# Patient Record
Sex: FEMALE | Race: WHITE | Employment: UNEMPLOYED | URBAN - NONMETROPOLITAN AREA
[De-identification: names, ages, dates, MRNs, and addresses within clinical notes are randomized per-mention and may not be internally consistent; named-entity substitution may affect disease eponyms.]

---

## 2020-08-26 ENCOUNTER — OFFICE VISIT (OUTPATIENT)
Dept: OBGYN | Age: 40
End: 2020-08-26
Payer: COMMERCIAL

## 2020-08-26 ENCOUNTER — HOSPITAL ENCOUNTER (OUTPATIENT)
Age: 40
Setting detail: SPECIMEN
Discharge: HOME | End: 2020-08-26
Payer: COMMERCIAL

## 2020-08-26 VITALS
HEIGHT: 66 IN | HEART RATE: 88 BPM | BODY MASS INDEX: 33.27 KG/M2 | SYSTOLIC BLOOD PRESSURE: 118 MMHG | DIASTOLIC BLOOD PRESSURE: 74 MMHG | TEMPERATURE: 96 F | WEIGHT: 207 LBS

## 2020-08-26 PROCEDURE — 99385 PREV VISIT NEW AGE 18-39: CPT | Performed by: NURSE PRACTITIONER

## 2020-08-26 PROCEDURE — G0145 SCR C/V CYTO,THINLAYER,RESCR: HCPCS

## 2020-08-26 RX ORDER — NORETHINDRONE ACETATE AND ETHINYL ESTRADIOL AND FERROUS FUMARATE 1MG-20(24)
KIT ORAL
COMMUNITY
Start: 2020-08-20

## 2020-08-26 SDOH — HEALTH STABILITY: MENTAL HEALTH: HOW OFTEN DO YOU HAVE A DRINK CONTAINING ALCOHOL?: NEVER

## 2020-08-26 ASSESSMENT — PATIENT HEALTH QUESTIONNAIRE - PHQ9
SUM OF ALL RESPONSES TO PHQ9 QUESTIONS 1 & 2: 0
1. LITTLE INTEREST OR PLEASURE IN DOING THINGS: 0
SUM OF ALL RESPONSES TO PHQ QUESTIONS 1-9: 0
SUM OF ALL RESPONSES TO PHQ QUESTIONS 1-9: 0
2. FEELING DOWN, DEPRESSED OR HOPELESS: 0

## 2020-08-26 NOTE — PROGRESS NOTES
Cyndee Ivan  2020              46 y.o. Chief Complaint   Patient presents with    Gynecologic Exam         Patient's last menstrual period was 2020. No referring provider defined for this encounter. HPI :Annual Exam  Patient presents for annual exam.  Counseling on healthy lifestyle reviewed, as well as the need for self breast exam. We discussed and reviewed the need for routine screenings and immunization updates when appropriate. Performs monthly self breast exams. On OCP and getting refills through and online pharmacy. Has a 1 year prescription. Menses have now stopped after taking 3 months of the pill. Using continuous regimen. Is not currently planning future pregnancies. Discussed vitamin D deficiency. Grew up in [place] and had a lot of sun exposure. Has numerous moles and skin lesions. Has not had skin assessment with dermatology, but would like one. The patient is sexually active. last pap: was normal  The patient has regular exercise: yes . We did review the need for and frequency of both weight bearing and strengthening as tolerated by the patient. 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 Lift on left and implant only on right. Menopause at n/a      Patient's last menstrual period was 07/24/2020. Hormone Exposure: Yes     Family History of Breast, Ovarian , Colon or Uterine Cancer: No     Preventative Health Testing:  Date of Last Pap Smear: 2016-done in [place]    ________________________________________________________________________      Review Of Systems:  General ROS:  negative  Hematological and Lymphatic ROS:negative   Breast ROS: negative  Cardiovascular ROS: negative  Respiratory ROS: negative   Gastrointestinal ROS: negative  Genito-Urinary ROS: negative  Psychological ROS: negative  Neurological ROS: negative  Musculoskeletal ROS: negative  Dermatological ROS: positive for skin lesions                                                                                                                                                                                   PHYSICAL Exam:     Constitutional:  Blood pressure 118/74, pulse 88, temperature 96 °F (35.6 °C), temperature source Temporal, height 5' 6\" (1.676 m), weight 207 lb (93.9 kg), last menstrual period 07/24/2020. General Appearance: This  is a well Developed, well Nourished, well groomed female. Her BMI was reviewed. Skin:  Numerous moles and other skin lesions. One dark mole left breast and other fleshy raised lesions on chest and back    Lymphatic:  No Lymph Nodes were Palpable in the neck , axilla or groin. Neck and EENT:  The neck was supple. There were no masses   The thyroid was not enlarged and had no masses. PERRLA, Nares Patent No Masses    Respiratory: The lungs were auscultated and found to be clear. There were no rales, rhonchi or wheezes. There was a good respiratory effort. Cardiovascular: The heart was in a regular rate and rhythm. . No S3 or S4. There was no murmur appreciated. Extremities: The patients extremities were without calf tenderness, edema, or varicosities. There was full range of motion in all four extremities. Pulses in all four extremities    Abdomen: The abdomen was soft and non-tender. There were good bowel sounds in all quadrants and there was no guarding, rebound or rigidity. On evaluation there was no evidence of hepatosplenomegaly and there was no costal vertebral noah tenderness bilaterally. No hernias were appreciated. Psych: The patient had a normal Orientation to: Time, Place, Person, and Situation  Mood and affect appropriate    Breast:  (Chest)  normal appearance, no masses or tenderness, Inspection negative, No nipple retraction or dimpling, No nipple discharge or bleeding, No axillary or supraclavicular adenopathy, Normal to palpation without dominant masses, positive findings: findings consistent with right breast lift, bilateral implants      Pelvic Exam:  External genitalia: normal general appearance  Urinary system: urethral meatus normal  Vaginal: normal without tenderness, induration or masses, normal rugae and cystocele present, (mild)  Cervix: normal appearance and thin prep PAP obtained  Adnexa: normal bimanual exam and non palpable  Uterus: normal single, nontender    Musculosk:  Normal Gait and station was noted. Digits were evaluated without abnormal findings. Range of motion, stability and strength were evaluated and found to be appropriate for the patients age. ASSESSMENT:      46 y.o. Annual   Diagnosis Orders   1. Well female exam with routine gynecological exam  CBC Auto Differential    Urinalysis with Microscopic    Comprehensive Metabolic Panel   2. Screening for cervical cancer  PAP SMEAR   3. Screening for lipoid disorders  Lipid Panel   4. Screening for thyroid disorder  TSH without Reflex    T4, Free   5. Encounter for vitamin deficiency screening  Vitamin D 25 Hydroxy   6. Skin lesions  [facility]   7.  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 Hereditary Breast, Ovarian, Colon and Uterine Cancer screening Done. Tobacco & Secondary smoke risks reviewed; instructed on cessation and avoidance    PLAN:  Return for Dermatology consult and pap in 1 year. Return for annual exams  Mammograms every 1 year. If 38 yo and last mammogram was negative. Routine health maintenance per patients PCP. Orders Placed This Encounter   Procedures    PAP SMEAR     Standing Status:   Future     Standing Expiration Date:   10/26/2020     Order Specific Question:   Collection Type     Answer: Thin Prep     Order Specific Question:   Prior Abnormal Pap Test     Answer:   No     Order Specific Question:   Screening or Diagnostic     Answer:   Screening     Order Specific Question:   HPV Requested? Answer:   Yes - If Abnormal Reflex HPV     Order Specific Question:   High Risk Patient     Answer:   N/A    CBC Auto Differential     Standing Status:   Future     Standing Expiration Date:   12/26/2020    Urinalysis with Microscopic     Standing Status:   Future     Standing Expiration Date:   12/26/2020     Order Specific Question:   SPECIFY(EX-CATH,MIDSTREAM,CYSTO,ETC)?      Answer:   midstream    Comprehensive Metabolic Panel     Standing Status:   Future     Standing Expiration Date:   12/26/2020    Lipid Panel     Standing Status:   Future     Standing Expiration Date:   12/26/2020     Order Specific Question:   Is Patient Fasting?/# of Hours     Answer:   yes    TSH without Reflex     Standing Status:   Future     Standing Expiration Date:   12/26/2020    T4, Free     Standing Status:   Future     Standing Expiration Date:   12/26/2020    Vitamin D 25 Hydroxy     Standing Status:   Future     Standing Expiration Date:   11/26/2020   [facility]     Referral Priority:   Routine     Referral Type:   Eval and Treat     Referral

## 2020-08-26 NOTE — PATIENT INSTRUCTIONS
Patient Education        Kegel Exercises: Care Instructions  Your Care Instructions     Kegel exercises strengthen muscles around the bladder. These muscles control the flow of urine. Kegel exercises are sometime called \"pelvic floor\" exercises. They can help prevent urine leakage and keep the pelvic organs in place. A woman who just had a baby might want to try Kegel exercises. They can strengthen pelvic muscles that have been weakened by pregnancy and childbirth. A man or woman may use Kegel exercises to treat urine leakage. You do Kegel exercises by tightening the muscles you use when you urinate. You will likely need to do these exercises for several weeks to get better. Follow-up care is a key part of your treatment and safety. Be sure to make and go to all appointments, and call your doctor if you are having problems. It's also a good idea to know your test results and keep a list of the medicines you take. How can you care for yourself at home? · Do Kegel exercises. ? Find the muscles you need to strengthen. To do this, tighten the muscles that stop your urine while you are going to the bathroom. These are the same muscles you squeeze during Kegel exercises. ? Squeeze the muscles as hard as you can. Your belly and thighs should not move. ? Hold the squeeze for 3 seconds. Then relax for 3 seconds. ? Start with 3 seconds, and then add 1 second each week until you are able to squeeze for 10 seconds. ? Repeat the exercise 10 to 15 times for each session. Do three or more sessions each day. · You can check to see if you are using the right muscles. Place a finger in your vagina and squeeze around it. You are doing them right when you feel pressure around your finger. Your doctor may also suggest that you put special weights in your vagina while you do the exercises. · Do not smoke. It can irritate the bladder. If you need help quitting, talk to your doctor about stop-smoking programs and medicines. These can increase your chances of quitting for good. Where can you learn more? Go to [email or web address] and sign in to your [employer] account. Enter T870 in the [employer] box to learn more about \"Kegel Exercises: Care Instructions. \"     If you do not have an account, please click on the \"Sign Up Now\" link. Current as of: August 22, 2019               Content Version: 12.5  © 1074-2031 Healthwise, Incorporated. Care instructions adapted under license by [facility] ([facility]). If you have questions about a medical condition or this instruction, always ask your healthcare professional. [address] any warranty or liability for your use of this information.

## 2020-08-28 ENCOUNTER — HOSPITAL ENCOUNTER (OUTPATIENT)
Dept: LAB | Age: 40
Discharge: HOME | End: 2020-08-28
Payer: COMMERCIAL

## 2020-08-28 LAB
-: ABNORMAL
ABSOLUTE EOS #: 0.1 K/UL (ref 0–0.44)
ABSOLUTE IMMATURE GRANULOCYTE: <0.03 K/UL (ref 0–0.3)
ABSOLUTE LYMPH #: 2.23 K/UL (ref 1.1–3.7)
ABSOLUTE MONO #: 0.25 K/UL (ref 0.1–1.2)
ALBUMIN SERPL-MCNC: 4.2 G/DL (ref 3.5–5.2)
ALBUMIN/GLOBULIN RATIO: 1.4 (ref 1–2.5)
ALP BLD-CCNC: 41 U/L (ref 35–104)
ALT SERPL-CCNC: 7 U/L (ref 5–33)
AMORPHOUS: ABNORMAL
ANION GAP SERPL CALCULATED.3IONS-SCNC: 9 MMOL/L (ref 9–17)
AST SERPL-CCNC: 12 U/L
BACTERIA: ABNORMAL
BASOPHILS # BLD: 1 % (ref 0–2)
BASOPHILS ABSOLUTE: 0.07 K/UL (ref 0–0.2)
BILIRUB SERPL-MCNC: 0.31 MG/DL (ref 0.3–1.2)
BILIRUBIN URINE: NEGATIVE
BUN BLDV-MCNC: 10 MG/DL (ref 6–20)
BUN/CREAT BLD: 13 (ref 9–20)
CALCIUM SERPL-MCNC: 9.2 MG/DL (ref 8.6–10.4)
CASTS UA: ABNORMAL /LPF (ref 0–2)
CHLORIDE BLD-SCNC: 107 MMOL/L (ref 98–107)
CHOLESTEROL/HDL RATIO: 4.3
CHOLESTEROL: 204 MG/DL
CO2: 24 MMOL/L (ref 20–31)
COLOR: ABNORMAL
COMMENT UA: ABNORMAL
CREAT SERPL-MCNC: 0.75 MG/DL (ref 0.5–0.9)
CRYSTALS, UA: ABNORMAL /HPF
DIFFERENTIAL TYPE: NORMAL
EOSINOPHILS RELATIVE PERCENT: 2 % (ref 1–4)
EPITHELIAL CELLS UA: ABNORMAL /HPF (ref 0–5)
GFR AFRICAN AMERICAN: >60 ML/MIN
GFR NON-AFRICAN AMERICAN: >60 ML/MIN
GFR SERPL CREATININE-BSD FRML MDRD: ABNORMAL ML/MIN/{1.73_M2}
GFR SERPL CREATININE-BSD FRML MDRD: ABNORMAL ML/MIN/{1.73_M2}
GLUCOSE BLD-MCNC: 106 MG/DL (ref 70–99)
GLUCOSE URINE: NEGATIVE
HCT VFR BLD CALC: 42.5 % (ref 36.3–47.1)
HDLC SERPL-MCNC: 48 MG/DL
HEMOGLOBIN: 13.8 G/DL (ref 11.9–15.1)
IMMATURE GRANULOCYTES: 0 %
KETONES, URINE: NEGATIVE
LDL CHOLESTEROL: 136 MG/DL (ref 0–130)
LEUKOCYTE ESTERASE, URINE: ABNORMAL
LYMPHOCYTES # BLD: 39 % (ref 24–43)
MCH RBC QN AUTO: 30.1 PG (ref 25.2–33.5)
MCHC RBC AUTO-ENTMCNC: 32.5 G/DL (ref 25.2–33.5)
MCV RBC AUTO: 92.6 FL (ref 82.6–102.9)
MONOCYTES # BLD: 4 % (ref 3–12)
MUCUS: ABNORMAL
NITRITE, URINE: NEGATIVE
NRBC AUTOMATED: 0 PER 100 WBC
OTHER OBSERVATIONS UA: ABNORMAL
PDW BLD-RTO: 12.4 % (ref 11.8–14.4)
PH UA: 6.5 (ref 5–6)
PLATELET # BLD: 278 K/UL (ref 138–453)
PLATELET ESTIMATE: NORMAL
PMV BLD AUTO: 9.7 FL (ref 8.1–13.5)
POTASSIUM SERPL-SCNC: 4.1 MMOL/L (ref 3.7–5.3)
PROTEIN UA: NEGATIVE
RBC # BLD: 4.59 M/UL (ref 3.95–5.11)
RBC # BLD: NORMAL 10*6/UL
RBC UA: ABNORMAL /HPF (ref 0–4)
RENAL EPITHELIAL, UA: ABNORMAL /HPF
SEG NEUTROPHILS: 54 % (ref 36–65)
SEGMENTED NEUTROPHILS ABSOLUTE COUNT: 3.13 K/UL (ref 1.5–8.1)
SODIUM BLD-SCNC: 140 MMOL/L (ref 135–144)
SPECIFIC GRAVITY UA: 1 (ref 1.01–1.02)
THYROXINE, FREE: 1.49 NG/DL (ref 0.93–1.7)
TOTAL PROTEIN: 7.1 G/DL (ref 6.4–8.3)
TRICHOMONAS: ABNORMAL
TRIGL SERPL-MCNC: 100 MG/DL
TSH SERPL DL<=0.05 MIU/L-ACNC: 0.2 MIU/L (ref 0.3–5)
TURBIDITY: ABNORMAL
URINE HGB: ABNORMAL
UROBILINOGEN, URINE: NORMAL
VITAMIN D 25-HYDROXY: 37 NG/ML (ref 30–100)
VLDLC SERPL CALC-MCNC: ABNORMAL MG/DL (ref 1–30)
WBC # BLD: 5.8 K/UL (ref 3.5–11.3)
WBC # BLD: NORMAL 10*3/UL
WBC UA: ABNORMAL /HPF (ref 0–4)
YEAST: ABNORMAL

## 2020-08-28 PROCEDURE — 85025 COMPLETE CBC W/AUTO DIFF WBC: CPT

## 2020-08-28 PROCEDURE — 84439 ASSAY OF FREE THYROXINE: CPT

## 2020-08-28 PROCEDURE — 80061 LIPID PANEL: CPT

## 2020-08-28 PROCEDURE — 36415 COLL VENOUS BLD VENIPUNCTURE: CPT

## 2020-08-28 PROCEDURE — 81001 URINALYSIS AUTO W/SCOPE: CPT

## 2020-08-28 PROCEDURE — 84443 ASSAY THYROID STIM HORMONE: CPT

## 2020-08-28 PROCEDURE — 82306 VITAMIN D 25 HYDROXY: CPT

## 2020-08-28 PROCEDURE — 80053 COMPREHEN METABOLIC PANEL: CPT

## 2020-09-01 LAB — CYTOLOGY REPORT: NORMAL

## 2020-10-19 ENCOUNTER — NURSE ONLY (OUTPATIENT)
Dept: PEDIATRICS | Age: 40
End: 2020-10-19
Payer: COMMERCIAL

## 2020-10-19 PROCEDURE — 90471 IMMUNIZATION ADMIN: CPT | Performed by: FAMILY MEDICINE

## 2020-10-19 PROCEDURE — 90686 IIV4 VACC NO PRSV 0.5 ML IM: CPT | Performed by: FAMILY MEDICINE

## 2021-03-22 ENCOUNTER — HOSPITAL ENCOUNTER (OUTPATIENT)
Age: 41
Setting detail: SPECIMEN
Discharge: HOME | End: 2021-03-22
Payer: COMMERCIAL

## 2021-03-22 ENCOUNTER — OFFICE VISIT (OUTPATIENT)
Dept: DERMATOLOGY | Age: 41
End: 2021-03-22
Payer: COMMERCIAL

## 2021-03-22 VITALS
BODY MASS INDEX: 35.32 KG/M2 | WEIGHT: 219.8 LBS | OXYGEN SATURATION: 99 % | DIASTOLIC BLOOD PRESSURE: 80 MMHG | HEIGHT: 66 IN | SYSTOLIC BLOOD PRESSURE: 130 MMHG | HEART RATE: 98 BPM

## 2021-03-22 DIAGNOSIS — L57.8 ACTINIC SKIN DAMAGE: ICD-10-CM

## 2021-03-22 DIAGNOSIS — D22.9 MULTIPLE NEVI: ICD-10-CM

## 2021-03-22 DIAGNOSIS — D48.5 NEOPLASM OF UNCERTAIN BEHAVIOR OF SKIN: Primary | ICD-10-CM

## 2021-03-22 PROCEDURE — 99203 OFFICE O/P NEW LOW 30 MIN: CPT | Performed by: DERMATOLOGY

## 2021-03-22 RX ORDER — OMEGA-3S/DHA/EPA/FISH OIL/D3 300MG-1000
400 CAPSULE ORAL DAILY
COMMUNITY

## 2021-03-22 NOTE — PATIENT INSTRUCTIONS

## 2021-03-24 LAB — DERMATOLOGY PATHOLOGY REPORT: NORMAL

## 2021-03-25 ENCOUNTER — TELEPHONE (OUTPATIENT)
Dept: DERMATOLOGY | Age: 41
End: 2021-03-25

## 2021-03-26 NOTE — TELEPHONE ENCOUNTER
No answer, will try back this afternoon
Please inform patient that the nevus on her breast was moderately atypical. To be on the safe side, we should re-excise with a small margin of normal skin. This will leave a small linear scar. This will need to be done in [place]. Please schedule 1 one hour and inform of post-procedural limitations. The one on the back was a mildly dysplastic nevus, nothing further needs to be done.
Pt advised.     Future Appointments   Date Time Provider Deena Staufferi   2021 10:00 AM Alfredo Jose MD [facility]   2021  9:00 AM PALMER Knapp - DIANE  [facility]   3/28/2022  1:15 PM [facility]
none

## 2021-04-08 ENCOUNTER — IMMUNIZATION (OUTPATIENT)
Dept: LAB | Age: 41
End: 2021-04-08
Payer: COMMERCIAL

## 2021-04-08 PROCEDURE — 0011A COVID-19, MODERNA VACCINE 100MCG/0.5ML DOSE: CPT | Performed by: INTERNAL MEDICINE

## 2021-04-08 PROCEDURE — 91301 COVID-19, MODERNA VACCINE 100MCG/0.5ML DOSE: CPT | Performed by: INTERNAL MEDICINE

## 2021-04-27 ENCOUNTER — PROCEDURE VISIT (OUTPATIENT)
Dept: DERMATOLOGY | Age: 41
End: 2021-04-27
Payer: COMMERCIAL

## 2021-04-27 VITALS
SYSTOLIC BLOOD PRESSURE: 136 MMHG | DIASTOLIC BLOOD PRESSURE: 84 MMHG | TEMPERATURE: 98 F | WEIGHT: 219 LBS | BODY MASS INDEX: 35.2 KG/M2 | HEIGHT: 66 IN | HEART RATE: 85 BPM | OXYGEN SATURATION: 97 %

## 2021-04-27 DIAGNOSIS — D22.9 ATYPICAL NEVUS: Primary | ICD-10-CM

## 2021-04-27 PROCEDURE — 11401 EXC TR-EXT B9+MARG 0.6-1 CM: CPT | Performed by: DERMATOLOGY

## 2021-04-27 PROCEDURE — 12032 INTMD RPR S/A/T/EXT 2.6-7.5: CPT | Performed by: DERMATOLOGY

## 2021-04-27 RX ORDER — LIDOCAINE HYDROCHLORIDE AND EPINEPHRINE 10; 10 MG/ML; UG/ML
10 INJECTION, SOLUTION INFILTRATION; PERINEURAL ONCE
Status: COMPLETED | OUTPATIENT
Start: 2021-04-27 | End: 2021-04-28

## 2021-04-27 NOTE — PATIENT INSTRUCTIONS
Post-operative Instructions for Excision Surgery    IF YOU HAVE SUTURES ON TOP OF THE SKIN  1. Wash hands with antibacterial soap before beginning wound care. Remove the pressure bandage the 24-48 hours after surgery. Apply Polysporin or Vaseline ointment over the counter with a Q-tip and/or gauze; cover with a clean band-aid 2-3 times daily UNTIL SUTURES OR STAPLES ARE REMOVED. The surgical area can get wet, unless otherwise directed. We recommend that you avoid direct water pressure on the surgical site during the healing process. Important: Keep the area moist with the ointment and covered at all times. Keeping the surgical site moist and covered will help to prevent scab formation. 2.  Wipe area with hydrogen peroxide on a Q-tip twice a day before applying ointment. IF YOU DO NOT HAVE SUTURES ON TOP OF THE SKIN    1. Leave bandage in place for 24-48 hours then remove bandage. 2. Leave Steri-strips in place and slowly trim back as the peel off  3. Gently wash area with soap and water do not scrub              ALL SURGERIES    1. For 2 days following surgery: No smoking, no ingestion of alcohol, no aspirin (unless prescribed as part of a cardiac or stroke regimen). Take only Tylenol (acetaminophen) or NSAIDS (Advil, Motrin, Aleve, Ibuprofen) for discomfort. Any blood-thinner or anticoagulation medication you regularly take should be continued at your prescribing doctors discretion. If you are on blood thinners I recommend buying over the counter wound seal powder in case of bleeding. If you have any bleeding please apply pressure for 15 minutes to the site and if still bleeding apply wound seal powder and call the office. 2. You may shower after removing the bandage applied the day of surgery (leave bandage in place for 24-48 hrs) - do not scrub the surgical site - gentle soap and water is ok  3.  For facial and scalp surgery: Do not bend over for 2 days and use 2 pillows to sleep for 2 nights. 4. Please note: Swelling and/or bruising may be visible below the surgical site due to gravitys pull. This may be more evident after facial procedures. Some very mild drainage onto the band-aid may be a normal part of wound healing a few days after surgery. 5. Do not do any heavy lifting or workout exercises UNTIL SUTURES OR STAPLES ARE REMOVED. No sit-ups, jogging, running, free-weight lifting, swimming, racquet ball, tennis, aerobics, golf, bowling, or speed-walking. You may walk at a normal pace. You may resume these activities after your suture/staple removal appointment. 6.  If the procedure is in an area where you shave, do not shave within 2 inches of the surgical site. 7. YUKO/Compression stocking instructions: If a YUKO/Compression stocking is applied, leave it in place until the morning. Following the dressing change, reapply the stocking and leave it on during the day. Remove the stocking at bedtime. Continue to wear the stocking until the sutures are removed. 8. During your surgery, your surgeon may have used an underlying layer of sutures, which are normally absorbed by the body. In some instances, the suture material may not be absorbed and you may need to come back for a follow-up visit. If you notice an acne-like bump or pimple forming along or in the suture line, as late as 2-8 weeks after your surgery, it may be the absorbable suture not being absorbed, and we would like you to call the office and make an appointment. An additional result of the procedure may be a slight raising or puckering at the edges of the surgical area due to overzealous healing at the surgery site. This may occur several weeks after the sutures have been removed. If you notice this, please call our office and make an appointment. 9. 2 weeks after excision (after sutures removed if external sutures) can use silicone gel or silicone tape to reduce scar appearance.  For silicone gel massage into scar twice daily for up to 12 weeks.  For silicone tape apply to scar and leave in place until it falls off then apply a new piece (silicone tape is recommended to be used for excision sites with substantial tension or movement (chest, shoulders, lower legs) use for 12 weeks    Additional comments or prescriptions:_____________________________________________________  Suture removal appointment Date & Time__________________________________________________  For emergencies, please call the office

## 2021-04-27 NOTE — PROGRESS NOTES
Dermatology Surgery Pre-Visit Checklist    (Please complete with  Y (yes) / N (no) or explain)    Pathologic Diagnosis: Dysplastic Nevus    Photo available of surgery site in media: yes     Competent to give informed consent?  Yes    If not, who is authorized to do so: no    Need for help for wound care: no   If so, who will do so: na    Are you diagnosed:   Diabetes: no   If yes, last A1C: na       HIV: no       Hepatitis: no       Current smoker: n  If yes, how much: na    So you have any of the following: Pacemaker: no       Defibrillator: no       Artificial Heart valve: no                Artificial Joints: no       Other Implantable Device: no       Organ Transplant: no       Other: no    Are you on blood thinners such as: Asprin: no       Warfarin/Coumadin: no       Other: no    Any allergies to the following:  Lidocaine: no       Iodine: no       Adhesive: no       Other: no

## 2021-04-28 RX ADMIN — LIDOCAINE HYDROCHLORIDE AND EPINEPHRINE 10 ML: 10; 10 INJECTION, SOLUTION INFILTRATION; PERINEURAL at 07:46

## 2021-04-28 NOTE — PROGRESS NOTES
Dermatology Procedure Note   [facility]YSIC[facility]ANS  [facility]  [address]  [facility] [address]  Dept: [phone]  Dept Fax: [phone]      Procedure Date: 4/27/2021  Procedure Time: 8:57 PM    Procedure Practitioner: Bo Wolf MD    Procedure: Excision    Pre-Procedure Diagnosis: Atypical Nevus    Post-Procedure Diagnosis: Same as Pre-Procedure Diagnosis    Informed Consent: The procedure and its risks were explained including but not limited to pain, bleeding, infection, permanent scar, permanent pigment alteration and recurrence. Consent to proceed with the procedure was obtained from the patient or the parent by the practitioner    Time Out:  A time out was conducted immediately before starting the procedure that confirmed a final verification of the correct patient, correct procedure, and correct site. Procedure Details:  Excision and layered closure: The 7 mm lesion on the left breast was cleaned with chlorhexidine and anesthetized with 1% lidocaine with epinephrine. The lesion was then \"excised in an elliptical fashion down to subcutaneous fat with a 1 mm margin for a total excised diameter of 9 mm. Hemostasis was then achieved with electrocautery. Due to tension on the defect standard undermining was performed to allow for closure. The subcutaneous tissues were then brought together with 4-0 Vicryl. The epidermis was approximated with 4-0 Monocryl subcuticular sutures, dermabond and steristrips. Final layered closure was 26 mm. Vaseline and a bandage were applied.     Procedure Performed By: Bo Wolf MD    Estimated Blood Loss: Minimal    Pathologic Specimen: H&E    Procedure Tolerance: Good    Complication(s): None    Electronically signed by Bo Wolf MD on 4/27/21 at 8:57 PM EDT

## 2021-05-06 ENCOUNTER — IMMUNIZATION (OUTPATIENT)
Dept: LAB | Age: 41
End: 2021-05-06
Payer: COMMERCIAL

## 2021-05-06 PROCEDURE — 91301 COVID-19, MODERNA VACCINE 100MCG/0.5ML DOSE: CPT | Performed by: INTERNAL MEDICINE

## 2021-05-06 PROCEDURE — 0012A COVID-19, MODERNA VACCINE 100MCG/0.5ML DOSE: CPT | Performed by: INTERNAL MEDICINE

## 2021-05-07 DIAGNOSIS — D22.9 ATYPICAL NEVUS: ICD-10-CM

## 2021-11-24 ENCOUNTER — NURSE ONLY (OUTPATIENT)
Dept: PEDIATRICS | Age: 41
End: 2021-11-24

## 2021-11-24 DIAGNOSIS — Z23 NEED FOR INFLUENZA VACCINATION: Primary | ICD-10-CM

## 2022-03-28 ENCOUNTER — OFFICE VISIT (OUTPATIENT)
Dept: DERMATOLOGY | Age: 42
End: 2022-03-28
Payer: COMMERCIAL

## 2022-03-28 VITALS
SYSTOLIC BLOOD PRESSURE: 124 MMHG | HEART RATE: 109 BPM | BODY MASS INDEX: 32.95 KG/M2 | OXYGEN SATURATION: 99 % | WEIGHT: 205 LBS | HEIGHT: 66 IN | DIASTOLIC BLOOD PRESSURE: 70 MMHG

## 2022-03-28 DIAGNOSIS — L91.0 KELOID: ICD-10-CM

## 2022-03-28 DIAGNOSIS — D22.9 MULTIPLE NEVI: Primary | ICD-10-CM

## 2022-03-28 PROCEDURE — 99212 OFFICE O/P EST SF 10 MIN: CPT | Performed by: DERMATOLOGY

## 2022-03-28 PROCEDURE — 99213 OFFICE O/P EST LOW 20 MIN: CPT | Performed by: DERMATOLOGY

## 2022-03-28 NOTE — PATIENT INSTRUCTIONS
- remove make up before next appointment  - continue using silicone gel sheets    Sun Protection     There are two types of sun rays that are harmful to the skin. UVA rays cause skin aging and skin cancer, such as melanoma. UVB rays cause sunburns, cataracts, and also contribute to skin cancer. The American-Academy of Dermatology recommends that children and adults wear a broad spectrum, waterproof sunscreen with a Sun Protection Factor (SPF) of 30 or higher. It is important to check the ingredient label to be sure the sunscreen will protect the skin from both UVA and UVB sunrays. Your sunscreen should contain at least one of the following ingredients: titanium dioxide, zinc oxide, or avobenzone. Sunscreen will not be effective unless it is applied to all exposed skin. Sunscreens work best if they are applied 30 minutes before sun exposure. They should be reapplied every 2 hours and after any water exposure. Sunscreen is not perfect. It is important to use other methods to protect the skin from sun exposure also. Wear hats, sunglasses and other sun protective clothing when outdoors. Stay in the shade during the peak hours of sun exposure between 10 AM and 4 PM.    Moles    Moles, or nevi, are very common. Moles are areas of the skin where there are more cells called melanocytes. Melanocytes are the cells in the body that produce pigment, or color. Moles can be many colors including skin-tone, pink, tan, brown, and very dark brown to black. Moles can be raised or flat. Moles can have hair. Moles can grow on any skin surface, including the scalp, hands and feet. When someone is born with a mole, or develops one in the first months of life, the mole is called a congenital, or birthmark mole. About 1 in 100 people are born with one or more moles. Most people develop their moles later in childhood or adulthood. These are called acquired moles.  They are most common on sun exposed areas of skin such as the face, neck, upper body, arms and legs. CHECKING MOLES  Most moles are harmless, but in rare cases moles may become cancerous. Checking moles and looking for changes is an important step in helping to catch worrisome changes early. Some changes to look for are asymmetry (moles that do not look the same on each half), irregular shapes or borders, uneven color or large size. Also look for any moles that bleed, itch, or become painful. Looking at your skin regularly can help you recognize moles that are more at risk for becoming cancerous. WHEN TO CALL THE DOCTOR  Call your doctor if you see any of the following changes in a mole:       Irregular borders (uneven shape or edges)       Changes in color to black, blue or red.      Changes in the surface texture       Scabs, scaling, irritation or bleeding in the mole    TREATMENT FOR MOLES  Often we can simply look at your moles and tell you if they look worrisome. If we are not concerned about the look of your moles at your appointment, we may measure some moles and take some photos that will allow us to watch for future changes in the moles. TREATMENT FOR MOLES  If a mole is getting irritated frequently, bleeding, difficult to watch due to location or dark color, atypical in appearance, or worrisome, we may perform a skin biopsy. A skin biopsy is a procedure that involves removing the mole so that it can be looked at under a microscope. There are many methods used to remove moles. The method we choose depends on the location of the mole, the size of the mole, and the amount of concern for skin cancer. Generally, removing moles in the dermatologists office is a simple and safe procedure that can be done with local anesthesia. PREVENTION  You can do some things to prevent moles from becoming cancerous:       Try to avoid long periods of time in the sun and severe sunburns.  The sun is        especially dangerous between 10:00 am and 4:00 pm.  [address] [place] [address] Use a broad spectrum, water-resistant sun block lotion with an SPF of 30 or        greater. A broad spectrum lotion blocks both UVA and UVB rays from the sun. Re-apply sunscreen at least every 2 hours and after swimming or sweating.      Take advantage of shade whenever possible. Wear a broad-brimmed hat,        sunglasses, and protective clothing when outdoors.      Do not use tanning beds.

## 2022-11-01 ENCOUNTER — TELEPHONE (OUTPATIENT)
Dept: OBGYN | Age: 42
End: 2022-11-01